# Patient Record
Sex: FEMALE | NOT HISPANIC OR LATINO | URBAN - METROPOLITAN AREA
[De-identification: names, ages, dates, MRNs, and addresses within clinical notes are randomized per-mention and may not be internally consistent; named-entity substitution may affect disease eponyms.]

---

## 2021-04-12 ENCOUNTER — IMMUNIZATIONS (OUTPATIENT)
Dept: FAMILY MEDICINE CLINIC | Facility: HOSPITAL | Age: 21
End: 2021-04-12

## 2021-04-12 PROCEDURE — 0031A: CPT

## 2021-04-12 PROCEDURE — 91303: CPT

## 2022-11-18 ENCOUNTER — APPOINTMENT (EMERGENCY)
Dept: RADIOLOGY | Facility: HOSPITAL | Age: 22
End: 2022-11-18

## 2022-11-18 ENCOUNTER — HOSPITAL ENCOUNTER (EMERGENCY)
Facility: HOSPITAL | Age: 22
Discharge: HOME | End: 2022-11-18
Attending: SURGERY

## 2022-11-18 VITALS
DIASTOLIC BLOOD PRESSURE: 74 MMHG | HEART RATE: 84 BPM | SYSTOLIC BLOOD PRESSURE: 120 MMHG | TEMPERATURE: 97.6 F | OXYGEN SATURATION: 100 % | RESPIRATION RATE: 20 BRPM
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
## 2022-11-18 LAB
BASE EXCESS BLDA CALC-SCNC: 0 MMOL/L (ref -2–3)
CA-I BLD-SCNC: 1.13 MMOL/L (ref 1.12–1.32)
GLUCOSE SERPL-MCNC: 92 MG/DL (ref 65–140)
HCO3 BLDA-SCNC: 22.4 MMOL/L (ref 24–30)
HCT VFR BLD CALC: 41 % (ref 34.8–46.1)
HGB BLDA-MCNC: 13.9 G/DL (ref 11.5–15.4)
PCO2 BLD: 23 MMOL/L (ref 21–32)
PCO2 BLD: 30.7 MM HG (ref 42–50)
PH BLD: 7.47 [PH] (ref 7.3–7.4)
PO2 BLD: 25 MM HG (ref 35–45)
POTASSIUM BLD-SCNC: 3.6 MMOL/L (ref 3.5–5.3)
SAO2 % BLD FROM PO2: 52 % (ref 60–85)
SODIUM BLD-SCNC: 141 MMOL/L (ref 136–145)
SPECIMEN SOURCE: ABNORMAL

## 2022-11-18 RX ORDER — ONDANSETRON 2 MG/ML
4 INJECTION INTRAMUSCULAR; INTRAVENOUS EVERY 4 HOURS PRN
Status: DISCONTINUED | OUTPATIENT
Start: 2022-11-18 | End: 2022-11-18 | Stop reason: HOSPADM

## 2022-11-18 RX ORDER — ONDANSETRON 2 MG/ML
INJECTION INTRAMUSCULAR; INTRAVENOUS
Status: COMPLETED
Start: 2022-11-18 | End: 2022-11-18

## 2022-11-18 RX ADMIN — SODIUM CHLORIDE 1000 ML: 0.9 INJECTION, SOLUTION INTRAVENOUS at 01:33

## 2022-11-18 RX ADMIN — IOHEXOL 100 ML: 350 INJECTION, SOLUTION INTRAVENOUS at 01:17

## 2022-11-18 RX ADMIN — ONDANSETRON 4 MG: 2 INJECTION INTRAMUSCULAR; INTRAVENOUS at 01:46

## 2022-11-18 NOTE — QUICK NOTE
Patient is alert, oriented, ambulating independently and safely, tolerating PO solids and liquids  Has some neck soreness that is msk in nature, not concerned about acute vertebral injury based on images and exam  Patient's friend, Alex Hall, was at bedside overnight and will be accompanying the patient home  The patient is safe for discharge at this time, will return immediately with any acute changes

## 2022-11-18 NOTE — PROGRESS NOTES
The patient had a CT scan of the cervical spine demonstrating no acute fractures or traumatic malalignment  On exam, the patient had no midline cervical spine tenderness  The patient had full range of motion (was then able to flex, extend, and rotate head side to side) without pain  There were no distracting injuries and the patient was not intoxicated  The patients cervical collar was cleared radiologically and clinically      Maria C Arzola MD  11/18/2022  1:55 AM

## 2022-11-18 NOTE — H&P
H&P - Trauma   Kenny Vila 25 y o  female MRN: [id number]  Unit/Bed#: TR 02 Encounter: [id number]    Trauma Alert: Level B   Model of Arrival: Ambulance    Trauma Team: Attending Osbaldo Lawson, Residents [facility] and Fellow [facility]  Consultants:     None     Assessment/Plan   Active Problems / Assessment:   21yo F with alcohol intoxication and witnessed fall with head strike, no known LOC     Plan:   - IVF   - nausea control PRN  - discussed with patient's brother, Saundra Solomon  [address] does not have family in the area so plan to wait until she's sober to send her home safely  - f/u CT head, spine, CAP    History of Present Illness     Chief Complaint: alcohol intoxication  Mechanism:Fall     HPI:    Kenny Vila is a 25 y o  female who presents as a level B trauma after sustaining a fall while intoxicated  Was at a party when friends witnessed her fall down 6 steps and hit her head  She does not recall losing consciousness  Denies nausea, vomiting, changes in vision  History is limited by patient's level of cooperation 2/2 acute intoxication  Review of Systems   Constitutional: Negative  HENT: Negative  Respiratory: Negative  Cardiovascular: Negative  Gastrointestinal: Negative  Genitourinary: Negative  Musculoskeletal: Negative  Skin: Negative  Neurological: Negative  Psychiatric/Behavioral: Negative  12-point, complete review of systems was reviewed and negative except as stated above  Historical Information     No past medical history on file  No past surgical history on file  Unable to obtain history due to Acute intoxication         There is no immunization history on file for this patient    Last Tetanus: 2008  Family History: Non-contributory     Meds/Allergies   current meds:   Current Facility-Administered Medications   Medication Dose Route Frequency   • ondansetron (ZOFRAN) injection 4 mg  4 mg Intravenous Q4H PRN   • sodium chloride 0 9 % bolus 1,000 mL  1,000 mL Intravenous Once    and PTA meds:   None     Allergies have not been reviewed; Not on File    Objective   Initial Vitals:   Temperature: 97 6 °F (36 4 °C) (11/18/22 0053)  Pulse: (!) 130 (11/18/22 0053)  Respirations: 20 (11/18/22 0053)  Blood Pressure: 114/84 (11/18/22 0053)    Primary Survey:   Airway:        Status: patent;        Pre-hospital Interventions: none        Hospital Interventions: none  Breathing:        Pre-hospital Interventions: none       Effort: normal       Right breath sounds: normal       Left breath sounds: normal  Circulation:        Rhythm: regular       Rate: regular   Right Pulses Left Pulses    R radial: 2+    R pedal: 2+     L radial: 2+    L pedal: 2+       Disability:        GCS: Eye: 4; Verbal: 5 Motor: 6 Total: 15       Right Pupil: round;  reactive         Left Pupil:  round;  reactive      R Motor Strength L Motor Strength    R : 5/5  R dorsiflex: 5/5  R plantarflex: 5/5 L : 5/5  L dorsiflex: 5/5  L plantarflex: 5/5        Sensory:  No sensory deficit  Exposure:       Completed: Yes      Secondary Survey:  Physical Exam  Vitals reviewed  Exam conducted with a chaperone present  Constitutional:       General: She is not in acute distress  Comments: Acutely intoxicated   HENT:      Head: Normocephalic and atraumatic  Right Ear: Tympanic membrane normal       Left Ear: Tympanic membrane normal       Nose: Nose normal       Mouth/Throat:      Mouth: Mucous membranes are moist    Eyes:      Extraocular Movements: Extraocular movements intact  Pupils: Pupils are equal, round, and reactive to light  Cardiovascular:      Rate and Rhythm: Regular rhythm  Tachycardia present  Pulses: Normal pulses  Heart sounds: Normal heart sounds  Pulmonary:      Effort: Pulmonary effort is normal  No respiratory distress  Breath sounds: Normal breath sounds  Abdominal:      General: There is no distension  Palpations: Abdomen is soft        Tenderness: There is no abdominal tenderness  There is no guarding or rebound  Musculoskeletal:         General: Normal range of motion  Cervical back: Normal range of motion  Skin:     General: Skin is warm  Neurological:      General: No focal deficit present  Mental Status: She is alert  Cranial Nerves: No cranial nerve deficit  Sensory: No sensory deficit  Invasive Devices     Peripheral Intravenous Line  Duration           Peripheral IV 11/18/22 Left Antecubital <1 day              Lab Results:   BMP/CMP:   Lab Results   Component Value Date    CO2 23 11/18/2022    GLUCOSE 92 11/18/2022    and CBC:   Lab Results   Component Value Date    HGB 13 9 11/18/2022    HCT 41 11/18/2022       Imaging Results: I have personally reviewed pertinent reports  Chest Xray(s): negative for acute findings   FAST exam(s): negative for acute findings   CT Scan(s): negative for acute findings   Additional Xray(s): N/A         Code Status: No Order  Advance Directive and Living Will:      Power of :    POLST:    I have spent 20 minutes with Patient and family today in which greater than 50% of this time was spent in counseling/coordination of care regarding Diagnostic results, Prognosis, Intructions for management, Importance of tx compliance and Impressions

## 2022-11-18 NOTE — PROCEDURES
POC FAST US    Date/Time: 11/18/2022 12:55 AM  Performed by: Humphrey Coreas MD  Authorized by: Humphrey Coreas MD     Patient location:  Trauma  Other Assisting Provider: Yes (comment)    Procedure details:     Exam Type:  Diagnostic    Indications: blunt abdominal trauma      Assess for:  Intra-abdominal fluid    Technique: FAST      Views obtained:  Heart - Pericardial sac, RUQ - Goddard's Pouch, LUQ - Splenorenal space and Suprapubic - Pouch of Giuliano    Image quality: diagnostic      Image availability:  Images available in PACS and video obtained  FAST Findings:     RUQ (Hepatorenal) free fluid: absent      LUQ (Splenorenal) free fluid: absent      Suprapubic free fluid: absent      Cardiac wall motion: identified      Pericardial effusion: absent    Interpretation:     Impressions: negative

## 2022-12-02 NOTE — ED PROVIDER NOTES
Emergency Department Airway Evaluation and Management Form    History  Obtained from: EMS  Review of patient's allergies indicates no known allergies  Chief Complaint:  Trauma Alert    HPI: Pt is a 25 y o  female presents s/p fall down 6 steps with head strike  +EtOH  I have reviewed and agree with the history as documented  Physical Exam    Vitals:    11/18/22 0500   BP: 120/74   Pulse: 84   Resp: 20   Temp:    SpO2: 100%     Supplemental Oxygen:None    GCS: 15    Neuro: Alert and oriented, intoxicated  Psych: not combative, not anxious, cooperative for exam  Neck: In collar, No JVD, No midline tenderness  Cardio:  Normal  Respiratory: Normal  Mouth:  Normal  Pharynx: Normal    Monitor:  NSR      ED Medications    No current facility-administered medications for this encounter  No current outpatient medications on file        Intubation    No intubation required    Final Diagnosis:  Alcohol Intoxication  Fall  Closed Head Injury    ED Provider  Electronically Signed by         Janie Grande MD  12/02/22 6021